# Patient Record
Sex: MALE | Race: WHITE | URBAN - METROPOLITAN AREA
[De-identification: names, ages, dates, MRNs, and addresses within clinical notes are randomized per-mention and may not be internally consistent; named-entity substitution may affect disease eponyms.]

---

## 2020-12-23 ENCOUNTER — TELEPHONE (OUTPATIENT)
Dept: FAMILY MEDICINE CLINIC | Facility: CLINIC | Age: 30
End: 2020-12-23

## 2020-12-23 NOTE — TELEPHONE ENCOUNTER
Is he moving to [place] or just here for holidays? Will see if staying here- but will be in January.

## 2020-12-23 NOTE — TELEPHONE ENCOUNTER
The only reason I'm sending this to you is because mom is a current patient of yours. Patient's mom is asking if you can see son. Son is a former [facility] patient, last seen 2014.    He is a warfarin patient.  He also has a HX of narcotic use/abuse and illicit drug use. Patient has been getting his warfarin through ED visits. He has been living in [place], [place] and has been here in [place] for 2 weeks with mom.     Would you like to accept him as a new patient?